# Patient Record
Sex: FEMALE | Race: WHITE
[De-identification: names, ages, dates, MRNs, and addresses within clinical notes are randomized per-mention and may not be internally consistent; named-entity substitution may affect disease eponyms.]

---

## 2020-01-04 ENCOUNTER — HOSPITAL ENCOUNTER (EMERGENCY)
Dept: HOSPITAL 62 - ER | Age: 25
Discharge: HOME | End: 2020-01-04
Payer: MEDICAID

## 2020-01-04 DIAGNOSIS — K08.89: Primary | ICD-10-CM

## 2020-01-04 DIAGNOSIS — F17.200: ICD-10-CM
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
## 2020-01-04 DIAGNOSIS — S02.5XXA: ICD-10-CM

## 2020-01-04 PROCEDURE — 99282 EMERGENCY DEPT VISIT SF MDM: CPT

## 2020-01-04 NOTE — ER DOCUMENT REPORT
HPI





- HPI


Patient complains to provider of: Dental pain


Time Seen by Provider: 01/04/20 22:18


Onset: Other - 2 Weeks


Onset/Duration: Persistent


Quality of pain: Achy


Pain Level: 5


Context: 





Patient presents complaining of dental pain from a fractured molar.  Patient rep

orts that tooth recently broke again.  Patient denies any fever or facial 

swelling.


Associated Symptoms: Other - Dental pain.  denies: Fever, Nausea, Vomiting, 

Shortness of breath


Exacerbated by: Denies


Relieved by: Denies


Similar symptoms previously: Yes


Recently seen / treated by doctor: No





- ROS


ROS below otherwise negative: Yes


Systems Reviewed and Negative: Yes All other systems reviewed and negative





- CONSTITUTIONAL


Constitutional: DENIES: Fever





- EENT


Notes: 





Toothache





- RESPIRATORY


Respiratory: DENIES: Coughing





- GASTROINTESTINAL


Gastrointestinal: DENIES: Nausea, Patient vomiting





- DERM


Skin Color: Normal


Skin Problems: None





Past Medical History





- General


Information source: Patient





- Social History


Smoking Status: Current Every Day Smoker


Frequency of alcohol use: None


Drug Abuse: None


Occupation: None


Lives with: Family


Family History: Reviewed & Not Pertinent





- Medical History


Medical History: Negative


Surgical Hx: Negative





- Immunizations


Hx Diphtheria, Pertussis, Tetanus Vaccination: Yes - received with [facility] Provider Document





- CONSTITUTIONAL


Agree With Documented VS: Yes


Exam Limitations: No Limitations


General Appearance: WD/WN, No Apparent Distress





- INFECTION CONTROL


TRAVEL OUTSIDE OF THE [place] IN LAST 30 DAYS: No





- HEENT


HEENT: Atraumatic, Normocephalic.  negative: Pharyngeal Exudate, Pharyngeal 

Tenderness, Pharyngeal Erythema, Tympanic Membrane Red


Mouth Diagram: 


                            __________________________














                            __________________________





 1 - Dental decay and fracture





 2 - Tenderness, no obvious decay or fracture





Notes: 





No trismus, no sublingual or submental swelling no potential airway compromise.





- NECK


Neck: Normal Inspection, Supple.  negative: Lymphadenopathy-Left, Lymphad

enopathy-Right





- RESPIRATORY


Respiratory: Breath Sounds Normal, No Respiratory Distress





- CARDIOVASCULAR


Cardiovascular: Regular Rate, Regular Rhythm





- MUSCULOSKELETAL/EXTREMETIES


Musculoskeletal/Extremeties: MAEW





- NEURO


Level of Consciousness: Awake, Alert, Appropriate


Motor/Sensory: No Motor Deficit





- DERM


Integumentary: Warm, Dry, No Rash





Course





- Vital Signs


Vital signs: 


                                        











Temp Pulse Resp BP Pulse Ox


 


 98.3 F   86   18   129/72 H  100 


 


 01/04/20 21:29  01/04/20 21:29  01/04/20 21:29  01/04/20 21:29  01/04/20 21:29














Discharge





- Discharge


Clinical Impression: 


 Tooth ache





Condition: Stable


Disposition: HOME, SELF-CARE


Instructions:  Oral Narcotic Medication ([facility]), Penicillin V K ([facility]), Toothache 

([facility])


Additional Instructions: 


Return immediately for any new or worsening symptoms





Followup with your dental care provider, call Monday to make a followup 

appointment








Prescriptions: 


Naproxen [Naprosyn 250 Nmg Tablet] 1 tab PO BID #14 tablet


Penicillin V Potassium [Penicillin Vk 500 mg Tablet] 500 mg PO BID #20 tablet


Referrals: 


JENIFER MCKEON MD [Primary Care Provider] - Follow up as needed


[facility] [Provider Group] - Follow up as needed

## 2020-01-05 VITALS — DIASTOLIC BLOOD PRESSURE: 70 MMHG | SYSTOLIC BLOOD PRESSURE: 124 MMHG
